# Patient Record
Sex: MALE | Race: WHITE | NOT HISPANIC OR LATINO | Employment: OTHER | URBAN - METROPOLITAN AREA
[De-identification: names, ages, dates, MRNs, and addresses within clinical notes are randomized per-mention and may not be internally consistent; named-entity substitution may affect disease eponyms.]

---

## 2017-01-24 ENCOUNTER — TELEPHONE (OUTPATIENT)
Dept: UROLOGY | Facility: CLINIC | Age: 82
End: 2017-01-24

## 2017-01-24 DIAGNOSIS — N39.0 URINARY TRACT INFECTION WITHOUT HEMATURIA, SITE UNSPECIFIED: Primary | ICD-10-CM

## 2017-01-24 NOTE — TELEPHONE ENCOUNTER
----- Message from Savita Haque sent at 1/24/2017  3:12 PM CST -----  Contact: Wife Waleska  Requesting an appointment sooner than March. Wife states his urine is very bad and she is concerned. Wants to only see Daftary. Call back [phone]. Thank you!

## 2017-01-24 NOTE — TELEPHONE ENCOUNTER
Spoke with patient received orders that for U/a and culture. Assisted w/ scheduling follow up appt. Date time and location confirmed

## 2017-01-25 ENCOUNTER — LAB VISIT (OUTPATIENT)
Dept: LAB | Facility: HOSPITAL | Age: 82
End: 2017-01-25
Attending: UROLOGY
Payer: MEDICARE

## 2017-01-25 ENCOUNTER — TELEPHONE (OUTPATIENT)
Dept: UROLOGY | Facility: CLINIC | Age: 82
End: 2017-01-25

## 2017-01-25 DIAGNOSIS — N39.0 URINARY TRACT INFECTION WITHOUT HEMATURIA, SITE UNSPECIFIED: ICD-10-CM

## 2017-01-25 LAB
BACTERIA #/AREA URNS HPF: ABNORMAL /HPF
BILIRUB UR QL STRIP: NEGATIVE
CLARITY UR: ABNORMAL
COLOR UR: YELLOW
GLUCOSE UR QL STRIP: NEGATIVE
HGB UR QL STRIP: NEGATIVE
KETONES UR QL STRIP: NEGATIVE
LEUKOCYTE ESTERASE UR QL STRIP: ABNORMAL
MICROSCOPIC COMMENT: ABNORMAL
NITRITE UR QL STRIP: NEGATIVE
PH UR STRIP: 6 [PH] (ref 5–8)
PROT UR QL STRIP: NEGATIVE
RBC #/AREA URNS HPF: 2 /HPF (ref 0–4)
SP GR UR STRIP: 1.02 (ref 1–1.03)
SQUAMOUS #/AREA URNS HPF: 2 /HPF
URN SPEC COLLECT METH UR: ABNORMAL
UROBILINOGEN UR STRIP-ACNC: NEGATIVE EU/DL
WBC #/AREA URNS HPF: 40 /HPF (ref 0–5)

## 2017-01-25 PROCEDURE — 87088 URINE BACTERIA CULTURE: CPT

## 2017-01-25 PROCEDURE — 87086 URINE CULTURE/COLONY COUNT: CPT

## 2017-01-25 PROCEDURE — 81000 URINALYSIS NONAUTO W/SCOPE: CPT

## 2017-01-25 NOTE — TELEPHONE ENCOUNTER
----- Message from Calli Pratt sent at 1/25/2017  2:22 PM CST -----  Contact: wife,Waleska Galeano wants to speak with a nurse regarding the patient's lab results. Please call back at [phone] (umas)

## 2017-01-25 NOTE — TELEPHONE ENCOUNTER
Spoke with patient who requested culture results informed that culture results takes approx. 2-3 days to return. Once received and reviewed by provider he will be notified w/ any needed recommendations

## 2017-01-27 LAB — BACTERIA UR CULT: NORMAL

## 2017-01-30 RX ORDER — CEFUROXIME AXETIL 500 MG/1
500 TABLET ORAL 2 TIMES DAILY
Qty: 28 TABLET | Refills: 0 | Status: SHIPPED | OUTPATIENT
Start: 2017-01-30 | End: 2017-02-13

## 2017-01-30 NOTE — TELEPHONE ENCOUNTER
----- Message from Richard Silva MD sent at 1/29/2017  5:44 PM CST -----  C&S - strep  U/A - many bacteria and WBC's    Rec: Ceftin 500 mg po bid x 2 weeks           Keep appt

## 2017-02-21 ENCOUNTER — OFFICE VISIT (OUTPATIENT)
Dept: UROLOGY | Facility: CLINIC | Age: 82
End: 2017-02-21
Payer: MEDICARE

## 2017-02-21 VITALS
DIASTOLIC BLOOD PRESSURE: 73 MMHG | HEART RATE: 86 BPM | SYSTOLIC BLOOD PRESSURE: 113 MMHG | WEIGHT: 207.44 LBS | BODY MASS INDEX: 27.49 KG/M2 | HEIGHT: 73 IN

## 2017-02-21 DIAGNOSIS — N39.0 URINARY TRACT INFECTION WITHOUT HEMATURIA, SITE UNSPECIFIED: ICD-10-CM

## 2017-02-21 DIAGNOSIS — N40.1 BENIGN NON-NODULAR PROSTATIC HYPERPLASIA WITH LOWER URINARY TRACT SYMPTOMS: Primary | ICD-10-CM

## 2017-02-21 PROCEDURE — 99999 PR PBB SHADOW E&M-EST. PATIENT-LVL II: CPT | Mod: PBBFAC,,, | Performed by: UROLOGY

## 2017-02-21 PROCEDURE — 99212 OFFICE O/P EST SF 10 MIN: CPT | Mod: PBBFAC,PO | Performed by: UROLOGY

## 2017-02-21 PROCEDURE — 99214 OFFICE O/P EST MOD 30 MIN: CPT | Mod: 25,S$PBB,, | Performed by: UROLOGY

## 2017-02-21 PROCEDURE — 81000 URINALYSIS NONAUTO W/SCOPE: CPT | Mod: PBBFAC,PO | Performed by: UROLOGY

## 2017-02-21 NOTE — PROGRESS NOTES
OFFICE NOTE    CHIEF COMPLAINT:  BPH.    HISTORY OF PRESENT ILLNESS:  This 81-year-old male returns for routine recheck.    He has a history of BPH for which he had undergone a TURP and SLV in June 2015   and overall his voiding quite well with no urinary complaints.  Since his last   visit of 08/17/2016, although he states he was treated for urinary tract   infections but doing well on today's visit.    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    PHYSICAL EXAMINATION:  ABDOMEN:  Soft, benign and nontender.  No masses.  No hernias or organomegaly.  EXTERNAL GENITAL:  Normal phallus with adequate meatus.  Testes descended and   feel normal.  No scrotal masses.  RECTAL:  A 15 g, smooth prostate.  No nodules.  Normal sphincter tone.    His last PSA was 2.0 on 08/17/2016.    UA negative with pH 5.0.    FINAL IMPRESSION:  BPH.    RECOMMENDATION:  Observation with recheck in three months.      MD/CECILIA  dd: 02/21/2017 17:38:47 (CST)  td: 02/21/2017 22:13:27 (CST)  Doc ID   #[id number]  Job ID #[id number]    CC:

## 2017-10-23 ENCOUNTER — TELEPHONE (OUTPATIENT)
Dept: UROLOGY | Facility: CLINIC | Age: 82
End: 2017-10-23

## 2017-10-23 NOTE — TELEPHONE ENCOUNTER
----- Message from Radha Willard sent at 10/23/2017 10:56 AM CDT -----  Contact: [phone]  Wife (Waleska)requesting appointment for patient for urinary leakage/no soon appointment showing available/wants to see doctor ONLY/please call back at [phone] to schedule or advise.

## 2017-10-23 NOTE — TELEPHONE ENCOUNTER
----- Message from Shu Estevez sent at 10/23/2017  1:29 PM CDT -----  Contact: Waleska Cruz (Spouse)  Waleska Cruz (Spouse) returning a missed call about an appt. Please advise. Call to pod. Call connected to pod. Warm transferred.  Thanks!

## 2017-11-01 ENCOUNTER — OFFICE VISIT (OUTPATIENT)
Dept: UROLOGY | Facility: CLINIC | Age: 82
End: 2017-11-01
Payer: MEDICARE

## 2017-11-01 VITALS
BODY MASS INDEX: 26 KG/M2 | TEMPERATURE: 98 F | SYSTOLIC BLOOD PRESSURE: 123 MMHG | WEIGHT: 196.19 LBS | HEIGHT: 73 IN | RESPIRATION RATE: 18 BRPM | DIASTOLIC BLOOD PRESSURE: 73 MMHG | HEART RATE: 87 BPM

## 2017-11-01 DIAGNOSIS — N13.8 ENLARGED PROSTATE WITH URINARY OBSTRUCTION: Primary | ICD-10-CM

## 2017-11-01 DIAGNOSIS — N40.1 ENLARGED PROSTATE WITH URINARY OBSTRUCTION: Primary | ICD-10-CM

## 2017-11-01 DIAGNOSIS — N40.1 BENIGN LOCALIZED PROSTATIC HYPERPLASIA WITH LOWER URINARY TRACT SYMPTOMS (LUTS): ICD-10-CM

## 2017-11-01 DIAGNOSIS — R33.9 INCOMPLETE EMPTYING OF BLADDER: ICD-10-CM

## 2017-11-01 LAB
BILIRUB SERPL-MCNC: NORMAL MG/DL
BLOOD URINE, POC: NORMAL
COLOR, POC UA: YELLOW
GLUCOSE UR QL STRIP: NORMAL
KETONES UR QL STRIP: NORMAL
LEUKOCYTE ESTERASE URINE, POC: NORMAL
NITRITE, POC UA: NORMAL
PH, POC UA: 6
PROTEIN, POC: NORMAL
SPECIFIC GRAVITY, POC UA: 1.01
UROBILINOGEN, POC UA: NORMAL

## 2017-11-01 PROCEDURE — 51798 US URINE CAPACITY MEASURE: CPT | Mod: PBBFAC,PN | Performed by: UROLOGY

## 2017-11-01 PROCEDURE — 81000 URINALYSIS NONAUTO W/SCOPE: CPT | Mod: PBBFAC,PN | Performed by: UROLOGY

## 2017-11-01 PROCEDURE — 99214 OFFICE O/P EST MOD 30 MIN: CPT | Mod: 25,S$PBB,, | Performed by: UROLOGY

## 2017-11-01 PROCEDURE — 99213 OFFICE O/P EST LOW 20 MIN: CPT | Mod: PBBFAC,PN | Performed by: UROLOGY

## 2017-11-01 PROCEDURE — 99999 PR PBB SHADOW E&M-EST. PATIENT-LVL III: CPT | Mod: PBBFAC,,, | Performed by: UROLOGY

## 2017-11-01 PROCEDURE — 81002 URINALYSIS NONAUTO W/O SCOPE: CPT | Mod: PBBFAC,PN | Performed by: UROLOGY

## 2017-11-01 RX ORDER — TAMSULOSIN HYDROCHLORIDE 0.4 MG/1
0.4 CAPSULE ORAL DAILY
Qty: 30 CAPSULE | Refills: 11 | Status: SHIPPED | OUTPATIENT
Start: 2017-11-01 | End: 2017-11-03 | Stop reason: ALTCHOICE

## 2017-11-01 RX ORDER — B-COMPLEX WITH VITAMIN C
TABLET ORAL
COMMUNITY
End: 2017-11-01 | Stop reason: SDUPTHER

## 2017-11-01 RX ORDER — CIPROFLOXACIN 500 MG/1
500 TABLET ORAL 2 TIMES DAILY
Qty: 30 TABLET | Refills: 0 | Status: SHIPPED | OUTPATIENT
Start: 2017-11-01 | End: 2017-12-05 | Stop reason: ALTCHOICE

## 2017-11-01 NOTE — PROGRESS NOTES
OFFICE NOTE    CHIEF COMPLAINT:  BPH with lower urinary tract symptoms.    HISTORY OF PRESENT ILLNESS:  This 82-year-old male returns for routine recheck.    He has a history of BPH for which he has undergone a TURP in June 2015 and has   been doing very well since then until over the past 6-8 months, he has been   noticing a strong odor to his urine and also has been experiencing some urinary   leakage over the past four to five weeks.  The patient does continue to take   Avodart.  Otherwise, he has had no other urologic changes since his last visit   of 02/21/2017, other than what was described above.    MEDICAL HISTORY UPDATE:  Reveals no change in his general health.    PHYSICAL EXAMINATION:  ABDOMEN:  Soft, benign, and nontender.  No masses.  No hernias or organomegaly.  EXTERNAL GENITALIA:  Normal phallus with adequate meatus.  Testes descended and   feel normal.  No scrotal masses.  RECTAL:  Reveals a 25 g smooth prostate.  No nodules.  Normal sphincter tone.    Bladder scan revealed 266 mL of postvoid residual.    His last PSA was 2.0 on 08/17/2016.    UA negative with pH 6.0.    FINAL IMPRESSION:  Benign prostatic hypertrophy with lower urinary tract   symptoms, incomplete bladder emptying.    RECOMMENDATIONS:  Trial of Flomax 0.4 mg p.o. daily and continue on Avodart 0.5   mg p.o. daily.  I will also place him on a trial of Cipro 500 mg p.o. b.i.d. for   possible prostatitis with recheck in one month to recheck the postvoid residual   and the response to the treatment.      KRUNAL  dd: 11/01/2017 17:47:55 (CDT)  td: 11/02/2017 07:25:57 (CDT)  Doc ID   #[id number]  Job ID #[id number]    CC:

## 2017-11-02 ENCOUNTER — TELEPHONE (OUTPATIENT)
Dept: UROLOGY | Facility: CLINIC | Age: 82
End: 2017-11-02

## 2017-11-02 NOTE — TELEPHONE ENCOUNTER
----- Message from Jasmine Beckwith sent at 11/2/2017 12:23 PM CDT -----  Contact: Waleska Cruz   Wife called regarding the patient's medication. The patient is allergic sulfur, last time taken broke out in red all over. Stating there is another alternative Rx, that does not contain sulfur. Please contact [phone]

## 2017-11-02 NOTE — TELEPHONE ENCOUNTER
Flomax has a warning to patients who have a sulfa allergy.  Patient had severe hives with sulfa.  His wife would like another drug sent to the pharmacy for him.   Please advise.

## 2017-11-02 NOTE — TELEPHONE ENCOUNTER
----- Message from Jasmine Beckwith sent at 11/2/2017  8:57 AM CDT -----  Contact: Waleska Boudreaux  Wife called regarding change of medication, need to discuss. Please contact [phone] (sudc)

## 2017-11-03 RX ORDER — ALFUZOSIN HYDROCHLORIDE 10 MG/1
10 TABLET, EXTENDED RELEASE ORAL
Qty: 30 TABLET | Refills: 11 | Status: SHIPPED | OUTPATIENT
Start: 2017-11-03 | End: 2018-02-26 | Stop reason: SDUPTHER

## 2017-11-03 NOTE — TELEPHONE ENCOUNTER
----- Message from Melissa Shelton sent at 11/3/2017  1:44 PM CDT -----  Contact: Waleska  Patient's wife is checking if Rx without sulfur was sent to     [id number] - [place], [place] - [address]  [address]  [place] [address]  Phone: [phone] Fax: [phone]    Please call when sent [phone]. Thanks!

## 2017-11-03 NOTE — TELEPHONE ENCOUNTER
Per DYLON from Dr. Silva, will call in Uroxetrol 10mg  One tablet by mouth daily.  #30 +11 refills     Tried to call patient to notify new medication has been called in.  No answer.  No voicemail. .

## 2017-11-06 ENCOUNTER — TELEPHONE (OUTPATIENT)
Dept: UROLOGY | Facility: CLINIC | Age: 82
End: 2017-11-06

## 2017-11-06 NOTE — TELEPHONE ENCOUNTER
----- Message from Melissa Garcia sent at 11/6/2017 10:06 AM CST -----  Contact: Bennie Avery with [facility] - [phone] is needing a copy of office notes from 11 01 17/please fax to [phone]

## 2017-12-05 ENCOUNTER — OFFICE VISIT (OUTPATIENT)
Dept: UROLOGY | Facility: CLINIC | Age: 82
End: 2017-12-05
Payer: MEDICARE

## 2017-12-05 VITALS
DIASTOLIC BLOOD PRESSURE: 61 MMHG | HEART RATE: 70 BPM | HEIGHT: 73 IN | SYSTOLIC BLOOD PRESSURE: 96 MMHG | TEMPERATURE: 98 F

## 2017-12-05 DIAGNOSIS — N13.8 ENLARGED PROSTATE WITH URINARY OBSTRUCTION: Primary | ICD-10-CM

## 2017-12-05 DIAGNOSIS — R33.9 INCOMPLETE EMPTYING OF BLADDER: ICD-10-CM

## 2017-12-05 DIAGNOSIS — N40.1 ENLARGED PROSTATE WITH URINARY OBSTRUCTION: Primary | ICD-10-CM

## 2017-12-05 PROCEDURE — 99999 PR PBB SHADOW E&M-EST. PATIENT-LVL III: CPT | Mod: PBBFAC,,, | Performed by: UROLOGY

## 2017-12-05 PROCEDURE — 99213 OFFICE O/P EST LOW 20 MIN: CPT | Mod: PBBFAC,PN | Performed by: UROLOGY

## 2017-12-05 PROCEDURE — 51798 US URINE CAPACITY MEASURE: CPT | Mod: PBBFAC,PN | Performed by: UROLOGY

## 2017-12-05 PROCEDURE — 99213 OFFICE O/P EST LOW 20 MIN: CPT | Mod: 25,S$PBB,, | Performed by: UROLOGY

## 2017-12-05 NOTE — PROGRESS NOTES
OFFICE NOTE    CHIEF COMPLAINT:  BPH with lower urinary tract symptoms, recent treatment for   prostatitis.    HISTORY OF PRESENT ILLNESS:  This 82-year-old male returns for routine recheck.    He has a history of BPH for which he has undergone a TURP in June 2015.    Overall he had been doing very well since then.  More recently he has been   having some trouble emptying his bladder and he was placed on both Flomax and   Avodart, which he is currently taking, but he does continue to have problems   with frequency and nocturia and intermittent episodes of urinary incontinence.    Of significance in his change in health since his last visit of 11/01/2017,   which wife also mentioned is that he has developed significant generalized   weakness and blood pressure was low and he is yet to be evaluated by his primary   care physician related to these things and it was mentioned to the wife that   this could be an important factor in terms of his voiding status.    Bladder scan today revealed 324 mL of residual urine.    His last PSA was 2.0 on 08/17/2016.    FINAL IMPRESSION:  BPH, lower urinary tract symptoms, generalized weakness,   hypotension as his blood pressure was 96/61 today.    RECOMMENDATIONS:  Arrangements were made for the patient to see primary care   physician and undergo general medical evaluation for his generalized weakness   and low blood pressure and it was also discussed with the wife that in view of   his incontinence problems, they decide between placement of Tran catheter, but   the patient's wife has been able to catheterize him in and out in the past, and   she states they have multiple catheters and she is willing to resume   intermittent self-catheterization which she states she will start doing and   notify us of the volumes and how he does, and otherwise, routine recheck in four   to six weeks.      KRUNAL  dd: 12/05/2017 17:37:11 (CST)  td: 12/06/2017 09:00:32 (CST)  Doc ID   #[id number]  Job  ID #[id number]    CC:

## 2018-01-22 ENCOUNTER — TELEPHONE (OUTPATIENT)
Dept: UROLOGY | Facility: CLINIC | Age: 83
End: 2018-01-22

## 2018-01-22 NOTE — TELEPHONE ENCOUNTER
----- Message from Ajaychepe Josee sent at 1/22/2018  9:12 AM CST -----  Contact: Asiya with [facility]  Asiya with [facility] states that the patient is having an odor to his urning.  [facility] for an order for a culture, a urinalysis culture, and they can collect on the next visit with this patient.  Can you please call Asiya back and you can give her a verbal.  Please call [phone].  Thank you.

## 2018-01-22 NOTE — TELEPHONE ENCOUNTER
Returned call, they will collect urine specimen on Wed, will obtain order from MD for culture, she verbally understood.

## 2018-02-26 RX ORDER — ALFUZOSIN HYDROCHLORIDE 10 MG/1
10 TABLET, EXTENDED RELEASE ORAL
Qty: 30 TABLET | Refills: 11 | Status: SHIPPED | OUTPATIENT
Start: 2018-02-26 | End: 2019-11-29 | Stop reason: SDUPTHER

## 2018-02-26 NOTE — TELEPHONE ENCOUNTER
Spoke to patient wife, she states that the med Uroxatral refill had lapsed and she did not get from the pharmacy. He is out of the med, Also the [facility] will be closing so they need a new pharmacy to sent to. [facility] [place]. Med pended to MD and phoned in, she verbally understood.

## 2018-04-03 ENCOUNTER — OFFICE VISIT (OUTPATIENT)
Dept: UROLOGY | Facility: CLINIC | Age: 83
End: 2018-04-03
Payer: MEDICARE

## 2018-04-03 ENCOUNTER — APPOINTMENT (OUTPATIENT)
Dept: LAB | Facility: HOSPITAL | Age: 83
End: 2018-04-03
Attending: UROLOGY
Payer: MEDICARE

## 2018-04-03 VITALS
TEMPERATURE: 98 F | SYSTOLIC BLOOD PRESSURE: 111 MMHG | HEIGHT: 73 IN | DIASTOLIC BLOOD PRESSURE: 70 MMHG | RESPIRATION RATE: 18 BRPM | WEIGHT: 196.19 LBS | HEART RATE: 80 BPM | BODY MASS INDEX: 26 KG/M2

## 2018-04-03 DIAGNOSIS — R82.81 PYURIA: ICD-10-CM

## 2018-04-03 DIAGNOSIS — R31.29 MICROSCOPIC HEMATURIA: ICD-10-CM

## 2018-04-03 DIAGNOSIS — R33.9 INCOMPLETE EMPTYING OF BLADDER: ICD-10-CM

## 2018-04-03 DIAGNOSIS — N40.1 ENLARGED PROSTATE WITH URINARY OBSTRUCTION: Primary | ICD-10-CM

## 2018-04-03 DIAGNOSIS — G62.9 PERIPHERAL POLYNEUROPATHY: ICD-10-CM

## 2018-04-03 DIAGNOSIS — N13.8 ENLARGED PROSTATE WITH URINARY OBSTRUCTION: Primary | ICD-10-CM

## 2018-04-03 LAB
BILIRUB SERPL-MCNC: ABNORMAL MG/DL
BLOOD URINE, POC: ABNORMAL
COLOR, POC UA: YELLOW
GLUCOSE UR QL STRIP: ABNORMAL
KETONES UR QL STRIP: ABNORMAL
LEUKOCYTE ESTERASE URINE, POC: ABNORMAL
NITRITE, POC UA: ABNORMAL
PH, POC UA: 5
PROTEIN, POC: ABNORMAL
SPECIFIC GRAVITY, POC UA: 1.02
UROBILINOGEN, POC UA: ABNORMAL

## 2018-04-03 PROCEDURE — 51798 US URINE CAPACITY MEASURE: CPT | Mod: PBBFAC,PN | Performed by: UROLOGY

## 2018-04-03 PROCEDURE — 88112 CYTOPATH CELL ENHANCE TECH: CPT

## 2018-04-03 PROCEDURE — 99214 OFFICE O/P EST MOD 30 MIN: CPT | Mod: S$PBB,,, | Performed by: UROLOGY

## 2018-04-03 PROCEDURE — 88112 CYTOPATH CELL ENHANCE TECH: CPT | Mod: 26,,,

## 2018-04-03 PROCEDURE — 99999 PR PBB SHADOW E&M-EST. PATIENT-LVL III: CPT | Mod: PBBFAC,,, | Performed by: UROLOGY

## 2018-04-03 PROCEDURE — 99213 OFFICE O/P EST LOW 20 MIN: CPT | Mod: PBBFAC,PN,25 | Performed by: UROLOGY

## 2018-04-03 PROCEDURE — 81002 URINALYSIS NONAUTO W/O SCOPE: CPT | Mod: PBBFAC,PN | Performed by: UROLOGY

## 2018-04-03 PROCEDURE — 81000 URINALYSIS NONAUTO W/SCOPE: CPT | Mod: PBBFAC,PN | Performed by: UROLOGY

## 2018-04-03 PROCEDURE — 87086 URINE CULTURE/COLONY COUNT: CPT

## 2018-04-03 PROCEDURE — 87088 URINE BACTERIA CULTURE: CPT

## 2018-04-03 NOTE — PROGRESS NOTES
OFFICE NOTE    CHIEF COMPLAINT:  BPH, lower urinary tract symptoms, and peripheral neuropathy   with generalized weakness.    HISTORY OF PRESENT ILLNESS:  This 82-year-old male returns for routine recheck.    He has a history of BPH and lower urinary tract symptoms for which he had been   using Uroxatral 10 mg p.o. daily and Avodart with which overall he has been   doing quite well.  He also had undergone a TURP in June 2015, following which he   was voiding very satisfactorily, but over the past several months, he has been   experiencing significant generalized weakness, especially in the lower   extremities and lower abdomen and he has been diagnosed with peripheral   neuropathy, which appears to be significant factor in his voiding ability as   well and probably has resulted in a neurogenic bladder.  He had been undergoing   clean intermittent catheterizations that were performed by his wife, but he has   not been receiving any lately.    MEDICAL HISTORY UPDATE:  Reveals worsening of his generalized peripheral   neuropathy as described above, especially to his lower extremities and lower   abdomen and he did need significant assistance to be positioned for examination.    PHYSICAL EXAMINATION:  ABDOMEN:  Soft.  EXTERNAL GENITALIA:  Normal phallus with adequate meatus.  RECTAL:  Deferred at this time in view of the difficulty patient positioning   himself.    Bladder scan revealed 240 mL of residual urine.    UA revealed number of wbc's and rbc's and bacteria with pH 7.0.    His last PSA was 2.0 on 08/17/2016 and it does not appear to be of any benefit   to continue monitoring his PSAs and this was discussed with the patient.    FINAL IMPRESSION:  Benign prostatic hypertrophy, lower urinary tract symptoms,   incomplete bladder emptying with what appears to be neurogenic bladder, result   of his peripheral neuropathy.    RECOMMENDATIONS:  We will obtain urine for C and S.  It was discussed with the   patient and  his wife for her to continue performing clean intermittent   catheterizations, which initially they will do it at b.i.d. and observe the   response.  Otherwise, routine recheck in one month and we will contact him with   the urine culture report.      KRUNAL  dd: 04/03/2018 17:34:45 (CDT)  td: 04/04/2018 12:18:17 (CDT)  Doc ID   #[id number]  Job ID #[id number]    CC:

## 2018-04-05 LAB
BACTERIA UR CULT: NORMAL
BACTERIA UR CULT: NORMAL

## 2018-05-01 ENCOUNTER — OFFICE VISIT (OUTPATIENT)
Dept: UROLOGY | Facility: CLINIC | Age: 83
End: 2018-05-01
Payer: MEDICARE

## 2018-05-01 VITALS
SYSTOLIC BLOOD PRESSURE: 113 MMHG | BODY MASS INDEX: 26 KG/M2 | HEIGHT: 73 IN | DIASTOLIC BLOOD PRESSURE: 72 MMHG | WEIGHT: 196.19 LBS | HEART RATE: 88 BPM | RESPIRATION RATE: 18 BRPM | TEMPERATURE: 98 F

## 2018-05-01 DIAGNOSIS — R31.29 MICROSCOPIC HEMATURIA: Primary | ICD-10-CM

## 2018-05-01 DIAGNOSIS — R82.81 PYURIA: ICD-10-CM

## 2018-05-01 DIAGNOSIS — N13.8 ENLARGED PROSTATE WITH URINARY OBSTRUCTION: ICD-10-CM

## 2018-05-01 DIAGNOSIS — N40.1 ENLARGED PROSTATE WITH URINARY OBSTRUCTION: ICD-10-CM

## 2018-05-01 DIAGNOSIS — R33.9 INCOMPLETE EMPTYING OF BLADDER: ICD-10-CM

## 2018-05-01 PROCEDURE — 51798 US URINE CAPACITY MEASURE: CPT | Mod: PBBFAC,PN | Performed by: UROLOGY

## 2018-05-01 PROCEDURE — 99213 OFFICE O/P EST LOW 20 MIN: CPT | Mod: 25,S$PBB,, | Performed by: UROLOGY

## 2018-05-01 PROCEDURE — 87086 URINE CULTURE/COLONY COUNT: CPT

## 2018-05-01 PROCEDURE — 99213 OFFICE O/P EST LOW 20 MIN: CPT | Mod: PBBFAC,PN,25 | Performed by: UROLOGY

## 2018-05-01 PROCEDURE — 81002 URINALYSIS NONAUTO W/O SCOPE: CPT | Mod: PBBFAC,PN | Performed by: UROLOGY

## 2018-05-01 PROCEDURE — 81000 URINALYSIS NONAUTO W/SCOPE: CPT | Mod: PBBFAC,PN | Performed by: UROLOGY

## 2018-05-01 PROCEDURE — 99999 PR PBB SHADOW E&M-EST. PATIENT-LVL III: CPT | Mod: PBBFAC,,, | Performed by: UROLOGY

## 2018-05-01 NOTE — PROGRESS NOTES
OFFICE NOTE    CHIEF COMPLAINT:  BPH with lower urinary tract symptoms, peripheral neuropathy   and generalized weakness.    HISTORY OF PRESENT ILLNESS:  This 82-year-old male returns for routine recheck.    He has a history of BPH with lower urinary tract symptoms that is currently   being treated with Uroxatral 10 mg p.o. daily and Avodart, and overall he is   doing very well with this treatment plan.  The patient is quite satisfied with   his voiding status.  He does have some occasional episodes of urge incontinence,   but is not a major problem.  At his last visit on 04/03/2018, urine for C and S   and cytology were obtained and both were negative.    On bladder scan that was done today, he was found to have 140 mL of residual   urine, which is an improvement compared to 240 at his last visit.    UA today though again revealed evidence of some wbc's, rbc's and bacteria with   question of urinary tract infection with pH 5.0.    FINAL IMPRESSION:  BPH with lower urinary tract symptoms that appear to be   responding well to the Uroxatral and Avodart for which he needs to continue.    Abnormal urinalysis, possible urinary tract infection.    RECOMMENDATION:  Urine for culture and sensitivity.  Continue with Uroxatral and   Avodart.  We will contact the patient with the urine test results and notify   him of his next appointment.      KRUNAL  dd: 05/01/2018 10:32:43 (CDT)  td: 05/02/2018 05:00:46 (ANGELAT)  Doc ID   #[id number]  Job ID #[id number]    CC:

## 2018-05-04 LAB
BACTERIA UR CULT: NORMAL
BACTERIA UR CULT: NORMAL

## 2018-08-28 ENCOUNTER — OFFICE VISIT (OUTPATIENT)
Dept: UROLOGY | Facility: CLINIC | Age: 83
End: 2018-08-28
Payer: MEDICARE

## 2018-08-28 VITALS
TEMPERATURE: 98 F | BODY MASS INDEX: 26 KG/M2 | DIASTOLIC BLOOD PRESSURE: 67 MMHG | RESPIRATION RATE: 18 BRPM | SYSTOLIC BLOOD PRESSURE: 114 MMHG | HEART RATE: 76 BPM | WEIGHT: 196.19 LBS | HEIGHT: 73 IN

## 2018-08-28 DIAGNOSIS — N13.8 ENLARGED PROSTATE WITH URINARY OBSTRUCTION: Primary | ICD-10-CM

## 2018-08-28 DIAGNOSIS — N39.45 CONTINUOUS LEAKAGE OF URINE: ICD-10-CM

## 2018-08-28 DIAGNOSIS — G62.9 PERIPHERAL POLYNEUROPATHY: ICD-10-CM

## 2018-08-28 DIAGNOSIS — R33.9 INCOMPLETE EMPTYING OF BLADDER: ICD-10-CM

## 2018-08-28 DIAGNOSIS — N40.1 ENLARGED PROSTATE WITH URINARY OBSTRUCTION: Primary | ICD-10-CM

## 2018-08-28 PROCEDURE — 99999 PR PBB SHADOW E&M-EST. PATIENT-LVL III: CPT | Mod: PBBFAC,,, | Performed by: UROLOGY

## 2018-08-28 PROCEDURE — 99213 OFFICE O/P EST LOW 20 MIN: CPT | Mod: PBBFAC,PN | Performed by: UROLOGY

## 2018-08-28 PROCEDURE — 99214 OFFICE O/P EST MOD 30 MIN: CPT | Mod: S$PBB,,, | Performed by: UROLOGY

## 2018-08-28 RX ORDER — B-COMPLEX WITH VITAMIN C
TABLET ORAL
COMMUNITY

## 2018-08-28 RX ORDER — ERYTHROMYCIN 5 MG/G
OINTMENT OPHTHALMIC
Refills: 3 | COMMUNITY
Start: 2018-08-23

## 2018-08-28 RX ORDER — LATANOPROST 50 UG/ML
SOLUTION/ DROPS OPHTHALMIC
Refills: 6 | COMMUNITY
Start: 2018-08-23

## 2018-08-28 NOTE — PROGRESS NOTES
OFFICE NOTE    CHIEF COMPLAINT:  BPH, lower urinary tract symptoms, peripheral neuropathy, and   generalized weakness.      HISTORY OF PRESENT ILLNESS:  This 83-year-old male returns for routine recheck.    He has a history of lower urinary tract symptoms related to both BPH and also   has peripheral neuropathy with neurogenic bladder.  He is currently on Avodart   and Uroxatral, but he does continue to have problems with urinary incontinence   for which he needs to wear pads.  His wife who was accompanying him, did   question whether she can resume performing intermittent catheterization, which   she had done in the past, which did help his incontinence.  He is not a candidate   in view of his medical status for any surgical procedures at this time.    His last PSA was 2.0 on 08/17/2016.    FINAL IMPRESSION:  Benign prostatic hypertrophy, lower urinary tract symptoms,   peripheral neuropathy, and urinary incontinence.    RECOMMENDATIONS:  Continue on Avodart and Uroxatral and the wife will start  performing intermittent catheterization b.i.d. in the morning and nighttime and   occasionally in the daytime, observe the response and notify us how it progresses   and they were supplied with catheters to start the process.  Otherwise,   routine recheck in two months.      KRUNAL  dd: 08/28/2018 18:07:04 (CDT)  td: 08/29/2018 11:15:19 (CDT)  Doc ID   #[id number]  Job ID #[id number]    CC:

## 2018-10-30 ENCOUNTER — OFFICE VISIT (OUTPATIENT)
Dept: UROLOGY | Facility: CLINIC | Age: 83
End: 2018-10-30
Payer: MEDICARE

## 2018-10-30 ENCOUNTER — APPOINTMENT (OUTPATIENT)
Dept: LAB | Facility: HOSPITAL | Age: 83
End: 2018-10-30
Attending: UROLOGY
Payer: MEDICARE

## 2018-10-30 VITALS
HEIGHT: 73 IN | DIASTOLIC BLOOD PRESSURE: 67 MMHG | BODY MASS INDEX: 26 KG/M2 | HEART RATE: 76 BPM | SYSTOLIC BLOOD PRESSURE: 108 MMHG | WEIGHT: 196.19 LBS

## 2018-10-30 DIAGNOSIS — N39.45 CONTINUOUS LEAKAGE OF URINE: ICD-10-CM

## 2018-10-30 DIAGNOSIS — N40.1 BENIGN LOCALIZED PROSTATIC HYPERPLASIA WITH LOWER URINARY TRACT SYMPTOMS (LUTS): ICD-10-CM

## 2018-10-30 DIAGNOSIS — N40.1 ENLARGED PROSTATE WITH URINARY OBSTRUCTION: ICD-10-CM

## 2018-10-30 DIAGNOSIS — R31.29 MICROSCOPIC HEMATURIA: Primary | ICD-10-CM

## 2018-10-30 DIAGNOSIS — N13.8 ENLARGED PROSTATE WITH URINARY OBSTRUCTION: ICD-10-CM

## 2018-10-30 DIAGNOSIS — G62.9 PERIPHERAL POLYNEUROPATHY: ICD-10-CM

## 2018-10-30 LAB
BILIRUB SERPL-MCNC: ABNORMAL MG/DL
BLOOD URINE, POC: ABNORMAL
COLOR, POC UA: YELLOW
GLUCOSE UR QL STRIP: ABNORMAL
KETONES UR QL STRIP: ABNORMAL
LEUKOCYTE ESTERASE URINE, POC: ABNORMAL
NITRITE, POC UA: ABNORMAL
PH, POC UA: 6
PROTEIN, POC: ABNORMAL
SPECIFIC GRAVITY, POC UA: 1.02
UROBILINOGEN, POC UA: ABNORMAL

## 2018-10-30 PROCEDURE — 87086 URINE CULTURE/COLONY COUNT: CPT

## 2018-10-30 PROCEDURE — 81000 URINALYSIS NONAUTO W/SCOPE: CPT | Mod: PBBFAC,PN | Performed by: UROLOGY

## 2018-10-30 PROCEDURE — 99999 PR PBB SHADOW E&M-EST. PATIENT-LVL III: CPT | Mod: PBBFAC,,, | Performed by: UROLOGY

## 2018-10-30 PROCEDURE — 88112 CYTOPATH CELL ENHANCE TECH: CPT | Performed by: PATHOLOGY

## 2018-10-30 PROCEDURE — 81002 URINALYSIS NONAUTO W/O SCOPE: CPT | Mod: PBBFAC,PN | Performed by: UROLOGY

## 2018-10-30 PROCEDURE — 99213 OFFICE O/P EST LOW 20 MIN: CPT | Mod: S$PBB,,, | Performed by: UROLOGY

## 2018-10-30 PROCEDURE — 99213 OFFICE O/P EST LOW 20 MIN: CPT | Mod: PBBFAC,PN,25 | Performed by: UROLOGY

## 2018-10-30 NOTE — PROGRESS NOTES
OFFICE NOTE    CHIEF COMPLAINT:  BPH, lower urinary tract symptoms, peripheral neuropathy,   generalized weakness, and neurogenic bladder.    HISTORY OF PRESENT ILLNESS:  This 83-year-old male returns for routine recheck.    He has a history of BPH with lower urinary tract symptoms that is currently   being managed with Uroxatral and Avodart, but he continues to have lower tract   symptoms due to his neurogenic bladder and has problems with incontinence,   especially at night.  His wife has been attempting to perform clean intermittent   catheterization twice a day, but she occasionally has some difficulty and   occasionally has some problems with hematuria.  It must be noted that due to the   patient's other comorbid conditions, he is not a surgical candidate.  We also   did discuss the possibility of applying a condom catheter, especially at   nighttime and for which arrangements will be made for the company representative   to contact the patient.    The patient and his wife did also mention he has been having problems with   dizziness in the mornings for the past three weeks, for which he was instructed   to follow up with his primary care physician.    UA did reveal some rbc's and wbc's, pH 6.0.    FINAL IMPRESSION:  Benign prostatic hypertrophy with lower urinary tract   symptoms, peripheral neuropathy, urinary incontinence, and neurogenic bladder.    RECOMMENDATION:  The patient was informed that he definitely need to follow up   with his primary care physician in terms of his dizziness.  Otherwise, urine   will be sent for C and S and cytology.  We will contact him with the results.    Arrangements will also be made for them to receive a condom catheter for him to   apply at nighttime.  Otherwise, he will continue on Avodart and Uroxatral that   he continues to take.  We will contact me with the urine test results.      KRUNAL  dd: 10/30/2018 18:41:39 (CDT)  td: 10/31/2018 08:59:29 (BRITTANY)  Doc ID   #[id number]   Job ID #[id number]    CC:

## 2018-10-31 ENCOUNTER — TELEPHONE (OUTPATIENT)
Dept: UROLOGY | Facility: CLINIC | Age: 83
End: 2018-10-31

## 2018-10-31 LAB — BACTERIA UR CULT: NORMAL

## 2018-10-31 NOTE — TELEPHONE ENCOUNTER
180 , ANGELIQUE, brought condom catheters to the clinic for patient to try.  Patient advised.  He will  today.  ANGELIQUE will contact patient for instructions and will provide supplies once determined the need.

## 2019-03-25 ENCOUNTER — TELEPHONE (OUTPATIENT)
Dept: UROLOGY | Facility: CLINIC | Age: 84
End: 2019-03-25

## 2019-03-25 NOTE — TELEPHONE ENCOUNTER
Returned call, patient will come in on tomorrow to give urine sample, patient wife verbally understood.

## 2019-03-26 ENCOUNTER — CLINICAL SUPPORT (OUTPATIENT)
Dept: UROLOGY | Facility: CLINIC | Age: 84
End: 2019-03-26
Payer: MEDICARE

## 2019-03-26 DIAGNOSIS — N39.0 URINARY TRACT INFECTION WITH HEMATURIA, SITE UNSPECIFIED: Primary | ICD-10-CM

## 2019-03-26 DIAGNOSIS — R31.9 URINARY TRACT INFECTION WITH HEMATURIA, SITE UNSPECIFIED: Primary | ICD-10-CM

## 2019-03-26 LAB
BILIRUB SERPL-MCNC: NEGATIVE MG/DL
BLOOD URINE, POC: ABNORMAL
COLOR, POC UA: ABNORMAL
GLUCOSE UR QL STRIP: NEGATIVE
KETONES UR QL STRIP: NEGATIVE
LEUKOCYTE ESTERASE URINE, POC: ABNORMAL
NITRITE, POC UA: POSITIVE
PH, POC UA: 5.5
PROTEIN, POC: ABNORMAL
SPECIFIC GRAVITY, POC UA: 1.02
UROBILINOGEN, POC UA: 0.2

## 2019-03-26 PROCEDURE — 87186 SC STD MICRODIL/AGAR DIL: CPT

## 2019-03-26 PROCEDURE — 87077 CULTURE AEROBIC IDENTIFY: CPT

## 2019-03-26 PROCEDURE — 87086 URINE CULTURE/COLONY COUNT: CPT

## 2019-03-26 PROCEDURE — 87088 URINE BACTERIA CULTURE: CPT

## 2019-03-26 PROCEDURE — 81002 URINALYSIS NONAUTO W/O SCOPE: CPT | Mod: PBBFAC,PN

## 2019-03-26 NOTE — PROGRESS NOTES
Patient arrived to clinic to give urine sample for UTI, poct done results abnormal, specimen prepared for lab pickup.

## 2019-03-28 ENCOUNTER — TELEPHONE (OUTPATIENT)
Dept: UROLOGY | Facility: CLINIC | Age: 84
End: 2019-03-28

## 2019-03-28 LAB — BACTERIA UR CULT: NORMAL

## 2019-03-28 NOTE — TELEPHONE ENCOUNTER
----- Message from RT Lakisha sent at 3/28/2019 10:08 AM CDT -----  Contact: Waleska,Wife,[phone]   Waleska,Wife,[phone], requesting the pt's UA lab test results, thanks.

## 2019-03-28 NOTE — TELEPHONE ENCOUNTER
Returned call, informed the urine culture is still in process, and we will contact when done, she verbally understood.

## 2019-03-28 NOTE — TELEPHONE ENCOUNTER
----- Message from Janeth Gil sent at 3/28/2019 10:50 AM CDT -----  Contact: spouse bowen greenwood ph#[phone]  Patient spouse bowen greenwood ph#[phone] requesting the results from sample that was drop off on Monday.  Please call

## 2019-03-28 NOTE — TELEPHONE ENCOUNTER
I left a message on the pt's cell that the results are not available at this time but we will call back once they are.

## 2019-03-29 ENCOUNTER — TELEPHONE (OUTPATIENT)
Dept: UROLOGY | Facility: CLINIC | Age: 84
End: 2019-03-29

## 2019-03-29 RX ORDER — CIPROFLOXACIN 500 MG/1
500 TABLET ORAL 2 TIMES DAILY
Qty: 30 TABLET | Refills: 0 | Status: SHIPPED | OUTPATIENT
Start: 2019-03-29

## 2019-03-29 NOTE — TELEPHONE ENCOUNTER
----- Message from Alisha Mason sent at 3/29/2019  3:02 PM CDT -----  Contact: Ning with [facility]   Ning is calling to let the office know that they sent patient's prescription for ciprofloxacin HCl (CIPRO) 500 MG tablet to the mail order, please resend to the pharmacy listed below.  Call Back#[phone]  Thanks     [facility]/pharmacy #[id number] - [place], [place] - [address] AT [facility]  [address]  [place] [address]  Phone: [phone] Fax: [phone]

## 2019-03-29 NOTE — TELEPHONE ENCOUNTER
I spoke with [facility] and the pt's wife in efforts to cancel the medication that was accidentally sent. [facility] @ [phone].

## 2019-04-17 ENCOUNTER — TELEPHONE (OUTPATIENT)
Dept: UROLOGY | Facility: CLINIC | Age: 84
End: 2019-04-17

## 2019-04-17 ENCOUNTER — CLINICAL SUPPORT (OUTPATIENT)
Dept: UROLOGY | Facility: CLINIC | Age: 84
End: 2019-04-17
Payer: MEDICARE

## 2019-04-17 DIAGNOSIS — N39.0 URINARY TRACT INFECTION WITHOUT HEMATURIA, SITE UNSPECIFIED: Primary | ICD-10-CM

## 2019-04-17 PROCEDURE — 87086 URINE CULTURE/COLONY COUNT: CPT

## 2019-04-17 NOTE — TELEPHONE ENCOUNTER
Returned call and spoke to patient's wife, patient completed his antibiotic and wants to give sample to make sure the infection cleared. Wife will drop off sample to office today, she verbally understood.

## 2019-04-17 NOTE — TELEPHONE ENCOUNTER
----- Message from Kandi Arevalo sent at 4/17/2019 10:35 AM CDT -----  Contact: WifeWaleska  Type: Needs Medical Advice    Who Called:  WifeWaleska  Symptoms (please be specific):    How long has patient had these symptoms:    Pharmacy name and phone #:    Best Call Back Number: [phone]  Additional Information: Patient's wife states patient finished the antibiotics 3 day ago and would like to bring in a urine sample. Sample is ready to be brought in. Patient would like to bring in today. Please call patient's wife. Thanks!

## 2019-04-19 LAB
BACTERIA UR CULT: NORMAL
BACTERIA UR CULT: NORMAL

## 2019-05-30 ENCOUNTER — TELEPHONE (OUTPATIENT)
Dept: UROLOGY | Facility: CLINIC | Age: 84
End: 2019-05-30

## 2019-05-30 ENCOUNTER — CLINICAL SUPPORT (OUTPATIENT)
Dept: UROLOGY | Facility: CLINIC | Age: 84
End: 2019-05-30
Payer: MEDICARE

## 2019-05-30 DIAGNOSIS — N39.0 URINARY TRACT INFECTION WITHOUT HEMATURIA, SITE UNSPECIFIED: Primary | ICD-10-CM

## 2019-05-30 LAB
BILIRUB SERPL-MCNC: NEGATIVE MG/DL
BLOOD URINE, POC: NEGATIVE
COLOR, POC UA: YELLOW
GLUCOSE UR QL STRIP: NEGATIVE
KETONES UR QL STRIP: NEGATIVE
LEUKOCYTE ESTERASE URINE, POC: NEGATIVE
NITRITE, POC UA: NEGATIVE
PH, POC UA: 5.5
PROTEIN, POC: NEGATIVE
SPECIFIC GRAVITY, POC UA: 1.02
UROBILINOGEN, POC UA: 0.2

## 2019-05-30 PROCEDURE — 81002 URINALYSIS NONAUTO W/O SCOPE: CPT | Mod: PBBFAC,PN

## 2019-05-30 NOTE — PROGRESS NOTES
Patient wife arrived to clinic and dropped off urine sample for testing, poct done, specimen prepared for lab .

## 2019-05-30 NOTE — TELEPHONE ENCOUNTER
Call placed to patient's wife to inform we need to recollect the urine sample, no answer, message left with call back number.

## 2019-05-31 ENCOUNTER — TELEPHONE (OUTPATIENT)
Dept: UROLOGY | Facility: CLINIC | Age: 84
End: 2019-05-31

## 2019-05-31 NOTE — TELEPHONE ENCOUNTER
Spoke to patient's wife, informed the UA was negative and patient should increase his water intake. She states she will do and she verbally understood.

## 2019-05-31 NOTE — TELEPHONE ENCOUNTER
----- Message from Richard Silva MD sent at 5/31/2019  2:44 PM CDT -----  Urinalysis negative on 05/30/2019    Rec:  Continue with routine follow-up

## 2019-07-05 ENCOUNTER — TELEPHONE (OUTPATIENT)
Dept: UROLOGY | Facility: CLINIC | Age: 84
End: 2019-07-05

## 2019-07-05 NOTE — TELEPHONE ENCOUNTER
Spoke with patient's wife again today she states she did not take patient to the emergency room. She is requesting a order for [facility] to check patient's urine. Does not want to take patient to pcp just to [facility]. Informed patient provider is not in clinic but if patient is falling down and has memory loss he should be evaluated by the emergency room. Wife verbally voiced understanding.

## 2019-07-05 NOTE — TELEPHONE ENCOUNTER
Spoke with patient's wife she states she does know if patient a infection right now. Wife states patient is falling a lot and just not doing well. Patient is very weak. Patient is having some memory issues as well. Patient recently fell yesterday. Advised wife she needs to take patient to the emergency room. Wife verbally voiced understanding and will take patient to [facility].

## 2019-07-05 NOTE — TELEPHONE ENCOUNTER
----- Message from Radha Wynn sent at 7/5/2019  9:48 AM CDT -----  Type: Needs Medical Advice    Who Called:  Waleska Cruz - spouse  Symptoms (please be specific):  fall in yard and had trouble getting up is weak, caller does not know if patient has a UTI  Best Call Back Number: [phone]  Additional Information:

## 2019-11-29 RX ORDER — ALFUZOSIN HYDROCHLORIDE 10 MG/1
TABLET, EXTENDED RELEASE ORAL
Qty: 90 TABLET | Refills: 2 | Status: SHIPPED | OUTPATIENT
Start: 2019-11-29
